# Patient Record
Sex: MALE | Race: WHITE | Employment: OTHER | URBAN - METROPOLITAN AREA
[De-identification: names, ages, dates, MRNs, and addresses within clinical notes are randomized per-mention and may not be internally consistent; named-entity substitution may affect disease eponyms.]

---

## 2020-03-02 RX ORDER — DIPHENHYDRAMINE HYDROCHLORIDE 50 MG/ML
50 INJECTION, SOLUTION INTRAMUSCULAR; INTRAVENOUS ONCE
Status: CANCELLED | OUTPATIENT
Start: 2020-03-02 | End: 2020-03-02

## 2020-03-02 RX ORDER — SODIUM CHLORIDE 0.9 % (FLUSH) 0.9 %
5-40 SYRINGE (ML) INJECTION AS NEEDED
Status: CANCELLED | OUTPATIENT
Start: 2020-03-02

## 2020-03-02 RX ORDER — ATROPINE SULFATE 0.1 MG/ML
0.5 INJECTION INTRAVENOUS
Status: CANCELLED | OUTPATIENT
Start: 2020-03-02 | End: 2020-03-03

## 2020-03-02 RX ORDER — EPINEPHRINE 0.1 MG/ML
1 INJECTION INTRACARDIAC; INTRAVENOUS
Status: CANCELLED | OUTPATIENT
Start: 2020-03-02 | End: 2020-03-03

## 2020-03-02 RX ORDER — DEXTROMETHORPHAN/PSEUDOEPHED 2.5-7.5/.8
1.2 DROPS ORAL
Status: CANCELLED | OUTPATIENT
Start: 2020-03-02

## 2020-03-02 RX ORDER — SODIUM CHLORIDE 0.9 % (FLUSH) 0.9 %
5-40 SYRINGE (ML) INJECTION EVERY 8 HOURS
Status: CANCELLED | OUTPATIENT
Start: 2020-03-02

## 2020-03-04 ENCOUNTER — HOSPITAL ENCOUNTER (OUTPATIENT)
Age: 75
Setting detail: OUTPATIENT SURGERY
Discharge: HOME | End: 2020-03-04
Attending: INTERNAL MEDICINE | Admitting: INTERNAL MEDICINE
Payer: MEDICARE

## 2020-03-04 VITALS
HEART RATE: 53 BPM | BODY MASS INDEX: 25.52 KG/M2 | RESPIRATION RATE: 16 BRPM | DIASTOLIC BLOOD PRESSURE: 77 MMHG | OXYGEN SATURATION: 93 % | SYSTOLIC BLOOD PRESSURE: 120 MMHG | WEIGHT: 172.3 LBS | HEIGHT: 69 IN | TEMPERATURE: 96.7 F

## 2020-03-04 PROCEDURE — 88342 IMHCHEM/IMCYTCHM 1ST ANTB: CPT

## 2020-03-04 PROCEDURE — 88305 TISSUE EXAM BY PATHOLOGIST: CPT

## 2020-03-04 PROCEDURE — G0500 MOD SEDAT ENDO SERVICE >5YRS: HCPCS | Performed by: INTERNAL MEDICINE

## 2020-03-04 PROCEDURE — 77030003657 HC NDL SCLER BSC -B: Performed by: INTERNAL MEDICINE

## 2020-03-04 PROCEDURE — 99153 MOD SED SAME PHYS/QHP EA: CPT | Performed by: INTERNAL MEDICINE

## 2020-03-04 PROCEDURE — 74011250636 HC RX REV CODE- 250/636: Performed by: INTERNAL MEDICINE

## 2020-03-04 PROCEDURE — 77030020018 HC MRKR ENDOSC SPOT 5ML SYR GISP -B: Performed by: INTERNAL MEDICINE

## 2020-03-04 PROCEDURE — 77030040361 HC SLV COMPR DVT MDII -B: Performed by: INTERNAL MEDICINE

## 2020-03-04 PROCEDURE — 76040000008: Performed by: INTERNAL MEDICINE

## 2020-03-04 PROCEDURE — 77030013991 HC SNR POLYP ENDOSC BSC -A: Performed by: INTERNAL MEDICINE

## 2020-03-04 PROCEDURE — 88341 IMHCHEM/IMCYTCHM EA ADD ANTB: CPT

## 2020-03-04 RX ORDER — TAMSULOSIN HYDROCHLORIDE 0.4 MG/1
0.4 CAPSULE ORAL DAILY
COMMUNITY

## 2020-03-04 RX ORDER — HYDROCHLOROTHIAZIDE 25 MG/1
25 TABLET ORAL DAILY
COMMUNITY

## 2020-03-04 RX ORDER — SODIUM CHLORIDE 9 MG/ML
1000 INJECTION, SOLUTION INTRAVENOUS CONTINUOUS
Status: DISCONTINUED | OUTPATIENT
Start: 2020-03-04 | End: 2020-03-04 | Stop reason: HOSPADM

## 2020-03-04 RX ORDER — FLUMAZENIL 0.1 MG/ML
0.2 INJECTION INTRAVENOUS
Status: DISCONTINUED | OUTPATIENT
Start: 2020-03-04 | End: 2020-03-04 | Stop reason: HOSPADM

## 2020-03-04 RX ORDER — ATORVASTATIN CALCIUM 40 MG/1
TABLET, FILM COATED ORAL DAILY
COMMUNITY

## 2020-03-04 RX ORDER — MIDAZOLAM HYDROCHLORIDE 1 MG/ML
.25-5 INJECTION, SOLUTION INTRAMUSCULAR; INTRAVENOUS
Status: DISCONTINUED | OUTPATIENT
Start: 2020-03-04 | End: 2020-03-04 | Stop reason: HOSPADM

## 2020-03-04 RX ORDER — FENTANYL CITRATE 50 UG/ML
100 INJECTION, SOLUTION INTRAMUSCULAR; INTRAVENOUS
Status: DISCONTINUED | OUTPATIENT
Start: 2020-03-04 | End: 2020-03-04 | Stop reason: HOSPADM

## 2020-03-04 RX ORDER — NALOXONE HYDROCHLORIDE 0.4 MG/ML
0.4 INJECTION, SOLUTION INTRAMUSCULAR; INTRAVENOUS; SUBCUTANEOUS
Status: DISCONTINUED | OUTPATIENT
Start: 2020-03-04 | End: 2020-03-04 | Stop reason: HOSPADM

## 2020-03-04 RX ADMIN — SODIUM CHLORIDE 1000 ML: 900 INJECTION, SOLUTION INTRAVENOUS at 08:32

## 2020-03-04 NOTE — H&P
Assessment/Plan  # Detail Type Description    1. Assessment Personal history of colonic polyps (Z86.010). Patient Plan 75 yr old male patient of Kenneth Medina seen for personal history of polyps. fx hx of colon cancer. he has one bm daily  Last colonoscopy was done 4/28/09 done by myself,  findings a single sessile polyp found in the splenic flexure, severe diverticulosis found in the descending colon, sigmoid colon and splenic flexure. Muscular hypertonia internal hemorrhoids and tortuous colon  Path revealed tubular adenoma     He is asymptomatic and has no family history of colon neoplasm. I explained to the patient the procedure of colonoscopy and the risks involved including but not limited to bleeding, perforation, infection or missing a lesion if the bowels are not well clean or are unusually tortuous and difficult. I gave him the  Suprep. He was agreeable to this and answered his questions. Plan Orders He will be scheduled for GASTROENTEROLOGY PROCEDURE, Next Lab Date is within 1 Month on 03/04/2020. Clinical information/comments: at location [facility]. The surgeon scheduled is Dieudonne Perez MD. An assistant has not been requested. This 75year old male presents for Hx polyp/colon cancer. History of Present Illness:  1. Hx polyp/colon cancer   Prior screening:  colonoscopy. Denies risk factors. Pertinent negatives include abdominal pain, change in bowel habits, change in stool caliber, constipation, decreased appetite, diarrhea, melena, nausea, rectal bleeding, vomiting, weight gain and weight loss. Additional information: No family history of colon cancer, No family history of Crohn's/colitis, NSAID/ASA use and Patient has occasional hemorrhoids. PROBLEM LIST:   Problem List reviewed.    Problem Description Onset Date Chronic Clinical Status Notes   COPD  Y  stage 1 per PFTs 4/2017   Benign essential hypertension 02/20/2015 Y     Solitary pulmonary nodule  Y  noted on LDCT, rpt CT in 6m - ,  scheduled as advised by radiologist.   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 The patient is right-handed. Preferred language is [facility] [place]. EDUCATION/EMPLOYMENT/OCCUPATION  Employment History Status Retired Restrictions    Retired        MARITAL STATUS/FAMILY/SOCIAL SUPPORT  Marital status:    Tobacco use status: Ex-cigarette smoker. Smoking status: Light tobacco smoker. TOBACCO SCREENING:  Patient has used tobacco.     SMOKING STATUS  Type Smoking Status Usage Per Day Years Used Pack Years Total Pack Years   Cigarette Light tobacco smoker  54.00     Cigar Light tobacco smoker 1 Cigars        TOBACCO/VAPING EXPOSURE  No passive smoke exposure. ALCOHOL  There is a history of alcohol use. Type: Beer and wine. consumed rarely. CAFFEINE  The patient uses caffeine: coffee - 4 cups a day. LIFESTYLE  Exercises occasionally. HOME ENVIRONMENT/SAFETY  The home has smoke detectors.   Carbon monoxide detector at home.    Uses seat belts. Medications (active prior to today)  Medication Name Sig Description Start Date Stop Date Refilled Rx Elsewhere   aspirin 81 mg Chewable Tab chew 1 tablet (81MG)  by oral route  every day 05/09/2012   N   clobetasol 0.05 % topical ointment apply by topical route 2 times every day a thin layer to the affected area(s) 03/14/2017 01/09/2020 03/14/2017 N   hydrochlorothiazide 25 mg tablet TAKE 1 TABLET  BY MOUTH DAILY 04/19/2019 04/19/2019 N   atorvastatin 40 mg tablet take 1 tablet by oral route  every day at bedtime. 04/19/2019 04/19/2019 N   Flomax 0.4 mg capsule TAKE 1 CAPSULE BY MOUTH DAILY 10/14/2019  10/14/2019 N   ProAir HFA 90 mcg/actuation aerosol inhaler INHALE TWO PUFFS BY MOUTH EVERY 4 TO 6 HOURS AS NEEDED. 10/14/2019  10/14/2019 N   Anoro Ellipta 62.5 mcg-25 mcg/actuation powder for inhalation INHALE 1 PUFF BY INHALATION ROUTE EVERY DAY AT SAME TIME EACH DAY. 10/14/2019  10/14/2019 N     Patient Status   Completed with information received for patient in a summary of care record. Medication Reconciliation  Medications reconciled today. Medication Reviewed  Adherence Medication Name Sig Desc Elsewhere Status   taking as directed aspirin 81 mg Chewable Tab chew 1 tablet (81MG)  by oral route  every day N Verified   taking as directed hydrochlorothiazide 25 mg tablet TAKE 1 TABLET  BY MOUTH DAILY N Verified   taking as directed atorvastatin 40 mg tablet take 1 tablet by oral route  every day at bedtime. N Verified   taking as directed Flomax 0.4 mg capsule TAKE 1 CAPSULE BY MOUTH DAILY N Verified   taking as directed ProAir HFA 90 mcg/actuation aerosol inhaler INHALE TWO PUFFS BY MOUTH EVERY 4 TO 6 HOURS AS NEEDED. N Verified   taking as directed Anoro Ellipta 62.5 mcg-25 mcg/actuation powder for inhalation INHALE 1 PUFF BY INHALATION ROUTE EVERY DAY AT SAME TIME EACH DAY.  N Verified     Medications (Added, Continued or Stopped today)  Start Date Medication Directions PRN Status PRN Reason Instruction Stop Date   10/14/2019 Anoro Ellipta 62.5 mcg-25 mcg/actuation powder for inhalation INHALE 1 PUFF BY INHALATION ROUTE EVERY DAY AT SAME TIME EACH DAY. N      05/09/2012 aspirin 81 mg Chewable Tab chew 1 tablet (81MG)  by oral route  every day N      04/19/2019 atorvastatin 40 mg tablet take 1 tablet by oral route  every day at bedtime. N      03/14/2017 clobetasol 0.05 % topical ointment apply by topical route 2 times every day a thin layer to the affected area(s) N   01/09/2020   10/14/2019 Flomax 0.4 mg capsule TAKE 1 CAPSULE BY MOUTH DAILY N      04/19/2019 hydrochlorothiazide 25 mg tablet TAKE 1 TABLET  BY MOUTH DAILY N      10/14/2019 ProAir HFA 90 mcg/actuation aerosol inhaler INHALE TWO PUFFS BY MOUTH EVERY 4 TO 6 HOURS AS NEEDED. N        Allergies:  Ingredient Reaction (Severity) Medication Name Comment   NO KNOWN ALLERGIES            Review of System  System Neg/Pos Details   Constitutional Negative Chills, Fever, Malaise, Weight gain and Weight loss. ENMT Negative Ear infections, Nasal congestion, Sinus Infection and Sore throat. Eyes Negative Double vision and Eye pain. Respiratory Positive Chronic cough. Respiratory Negative Asthma, Dyspnea, Pleuritic pain and Wheezing. Cardio Negative Chest pain, Edema and Irregular heartbeat/palpitations. GI Negative Abdominal pain, Change in bowel habits, Change in stool caliber, Constipation, Decreased appetite, Diarrhea, Dysphagia, Heartburn, Hematemesis, Hematochezia, Melena, Nausea, Rectal bleeding, Reflux and Vomiting.  Negative Dysuria, Hematuria, Urinary frequency, Urinary incontinence and Urinary retention. Endocrine Negative Cold intolerance, Gynecomastia, Heat intolerance and Increased thirst.   Neuro Negative Dizziness, Headache, Numbness, Tremors and Vertigo. Psych Negative Anxiety, Depression and Increased stress. Integumentary Negative Hives, Pruritus and Rash.    MS Negative Back pain, Joint pain and Myalgia. Hema/Lymph Negative Easy bleeding, Easy bruising and Lymphadenopathy. Allergic/Immuno Positive Seasonal allergies. Allergic/Immuno Negative Chemicals in work place, Contact allergy, Food allergies and Immunosuppression. Reproductive Negative Penile discharge and Sexual dysfunction. Vital Signs   Height  Time ft in cm Last Measured Height Position   12:17 PM 5.0 10.00 177.80 10/14/2019 0     MAP (Calculated) Arterial Line 1 BP (mmHg) BP Patient Position Resp SpO2 O2 Device O2 Flow Rate (L/min) Pre/Post Ductal Weight       03/04/20 0814 98.1 °F (36.7 °C) 60 138/85 103   17 96 % Room air   78.2 kg (172 lb 4.8 oz)       PHYSICAL EXAM:  Exam Findings Details   Constitutional Normal No acute distress. Well Nourished. Well developed. Eyes Normal General - Right: Normal, Left: Normal. Conjunctiva - Right: Normal, Left: Normal. Sclera - Right: Normal, Left: Normal. Cornea - Right: Normal, Left: Normal. Pupil - Right: Normal, Left: Normal.   Nose/Mouth/Throat Normal Lips/teeth/gums - Normal. Tongue - Normal. Buccal mucosa - Normal. Palate & uvula - Normal.   Neck Exam Normal Inspection - Normal. Palpation - Normal. Thyroid gland - Normal. Cervical lymph nodes - Normal.   Respiratory Normal Inspection - Normal. Auscultation - Normal. Percussion - Normal. Cough - Absent. Effort - Normal.   Cardiovascular Normal Heart rate - Regular rate. Heart sounds - Normal S1, Normal S2. Murmurs - None. Extremities - No edema. Abdomen Normal Inspection - Normal. Appliance(s) - None. Abdominal muscles - Normal. Auscultation - Normal. Percussion - Normal. Anterior palpation - Normal, No guarding, No rebound. CVA tenderness - None. Umbilicus - Normal. Abdominal reflexes - Normal. No abdominal tenderness. No hepatic enlargement. No splenic enlargement. No hernia. No Ascites. No palpable mass. Martinez's sign - Negative.    Skin Normal Inspection - Normal.   Musculoskeletal Normal Hands - Left: Normal, Right: Normal.   Extremity Normal No cyanosis. No edema. Clubbing - Absent. Neurological Normal Level of consciousness - Normal. Orientation - Normal. Memory - Normal. Motor - Normal. Balance & gait - Normal. Coordination - Normal. Fine motor skills - Normal. DTRs - Normal.   Psychiatric Normal Orientation - Oriented to time, place, person & situation. Not anxious. Appropriate mood and affect. Behavior appropriate for age. Sufficient language. No memory loss.        No change in H&P

## 2020-03-04 NOTE — PROCEDURES
[facility]  Colonoscopy Procedure Report  _______________________________________________________  Patient: Segundo Warren                                         Attending Physician: Saida Jacques MD    Patient ID: [id number]                                      Referring Physician: Faye Posadas MD    Exam Date: March 4, 2020 _______________________________________________________      Introduction: A  75 y.o. male patient, presents for outpatient Colonoscopy    Indications: patient of Betito Tidwell seen for personal history of polyps. He has one bm daily. Last colonoscopy was done 4/28/09 done by myself,  findings a sessile polyp in the splenic flexure, severe diverticulosis found in the descending colon, sigmoid colon and splenic flexure. Muscular hypertonia internal hemorrhoids and tortuous colon  Path revealed tubular adenoma     He is asymptomatic and has no family history of colon neoplasm. Consent: The benefits, risks, and alternatives to the procedure were discussed and informed consent was obtained from the patient. Preparation: EKG, pulse, pulse oximetry and blood pressure were monitored throughout the procedure. ASA Classification: Class 1 - . The heart is an S1-S2 and regular heart rate and rhythm. Lungs are clear to auscultation and percussion. Abdomen is soft, nondistended, and nontender. Mental Status: awake, alert, and oriented to person, place, and time    Medications:  · Fentanyl 100 mcg IV before procedure. · Versed 5 mg IV throughout the procedure. Rectal Exam: Normal Rectal Exam. No Blood. Prostate not enlarged. Pathology Specimens: Three specimens removed. Procedure: The colonoscope was passed with difficulty through the anus under direct visualization and advanced to the cecum and 10 cm inside the terminal ileum. The patient required positioning on the back to aid in the passage of the scope. The scope was withdrawn and the mucosa was carefully examined. The quality of the preparation was excellent. The views were excellent. The patient's toleration of the procedure was excellent. Retroflexion was preformed in the ascending colon and hepatic flexure. The exam was done twice to the cecum. Total time is 45 minutes and withdrawal time is 30 minutes. Findings:    Rectum:   Small internal hemorrhoids  Sigmoid:   Tortuous sigmoid colon with severe diverticulosis and muscular hypertonia. 2 sessile polyps in the sigmoid one adenoma 6 mm cold snared and second 6.5 mm sessile hyperplastic? Hot snared. Descending Colon:   Normal  Transverse Colon:   7 sessile adenoma polyp in the proximal transverse colon, hot snared and a 6 mm submucosal hard GIST type nodule in the mid transverse colon hot snared with residual tissue embedded in the muscularis propria suggestive of leiomyoma submitted in different bottle. [place] ink spot injected submucosally  Just distal to the resection site in two adjacent locations to ming it for future reference. Ascending Colon: Moderate diverticulosis in the ascending colon. Cecum:   Normal  Terminal Ileum:   Normal      Unplanned Events: There were no unplanned events. Estimated Blood Loss: None  Impressions:    Small internal hemorrhoids. Small internal hemorrhoids. Tortuous sigmoid colon with severe diverticulosis and muscular hypertonia. 2 sessile polyps in the sigmoid one adenoma 6 mm cold snared and second 6.5 mm sessile hyperplastic? Hot snared. 7 sessile adenoma polyp in the proximal transverse colon, hot snared and a 6 mm submucosal hard GIST type nodule in the mid transverse colon hot snared with residual tissue embedded in the muscularis propria suggestive of leiomyoma submitted in different bottle. [place] ink spot injected submucosally  Just distal to the resection site in two adjacent locations to ming it for future reference. Moderate diverticulosis in the ascending colon. Normal Mucosa.    Complications: None; patient tolerated the procedure well. Recommendations:  · Discharge home when standard parameters are met. · Resume a high fiber diet. · Colonoscopy recommendation in 5 years pending the result of the histology.   · Avoid being constipated    Procedure Codes:    · Jonah Zafar [[id number]]  · COLONOSCPY,FLEX,W/ [facility] [[id number]]    Endoscope Information:  Model Number(s)    [id number]   Assistant: None  Signed By: Joon Jones MD Date: March 4, 2020

## 2020-03-04 NOTE — DISCHARGE INSTRUCTIONS
Amelia Donato  [id number]  1945    COLON DISCHARGE INSTRUCTIONS    Discomfort:  Redness at IV site- apply warm compress to area; if redness or soreness persist- contact your physician  There may be a slight amount of blood passed from the rectum  Gaseous discomfort- walking, belching will help relieve any discomfort  You may not operate a vehicle til the next day. You may not engage in an occupation involving machinery or appliances til the next day. You may not drink alcoholic beverages til the next day. DIET:   High fiber diet. ACTIVITY:  You may not  resume your normal daily activities til the next day. it is recommended that you spend the remainder of the day resting -  avoid any strenuous activity. CALL M.D.  IF ANY SIGN OF:   Increasing pain, nausea, vomiting  Abdominal distension (swelling)  New increased bleeding (oral or rectal)  Fever (chills)  Pain in chest area  Bloody discharge from nose or mouth  Shortness of breath    You may not  take any Advil, Aspirin, Ibuprofen, Motrin, Aleve, or Goodys for 7 days, ONLY  Tylenol as needed for pain. Post procedure diagnosis:  HEMORRHOIDS; SIGMOID DIVERTICULOSIS; TORTUOUS COLON; COLON POLYPS    Follow-up Instructions: Your follow up colonoscopy will be in 5 years. We will notify you the results of your biopsy by letter within 2 weeks.     Robby Dixon MD  March 4, 2020       DISCHARGE SUMMARY from Nurse    The following personal items collected during your admission are returned to you:   Dental Appliance: Dental Appliances: None  Vision: Visual Aid: None  Hearing Aid:    Jewelry:    Clothing:    Other Valuables:    Valuables sent to safe:              PATIENT INSTRUCTIONS:    After general anesthesia or intravenous sedation, for 24 hours or while taking prescription Narcotics:  · Limit your activities  · Do not drive and operate hazardous machinery  · Do not make important personal or business decisions  · Do  not drink alcoholic beverages  · If you have not urinated within 8 hours after discharge, please contact your surgeon on call. Report the following to your surgeon:  · Excessive pain, swelling, redness or odor of or around the surgical area  · Temperature over 100.5  · Nausea and vomiting lasting longer than 4 hours or if unable to take medications  · Any signs of decreased circulation or nerve impairment to extremity: change in color, persistent  numbness, tingling, coldness or increase pain  · Any questions      No orders of the defined types were placed in this encounter. What to do at Home:  Recommended activity: as above    If you experience any of the following symptoms as above, please follow up with Dr. Jayda Tobias. *  Please give a list of your current medications to your Primary Care Provider. *  Please update this list whenever your medications are discontinued, doses are      changed, or new medications (including over-the-counter products) are added. *  Please carry medication information at all times in case of emergency situations. These are general instructions for a healthy lifestyle:    No smoking/ No tobacco products/ Avoid exposure to second hand smoke    Surgeon General's Warning:  Quitting smoking now greatly reduces serious risk to your health. Obesity, smoking, and sedentary lifestyle greatly increases your risk for illness    A healthy diet, regular physical exercise & weight monitoring are important for maintaining a healthy lifestyle    You may be retaining fluid if you have a history of heart failure or if you experience any of the following symptoms:  Weight gain of 3 pounds or more overnight or 5 pounds in a week, increased swelling in our hands or feet or shortness of breath while lying flat in bed. Please call your doctor as soon as you notice any of these symptoms; do not wait until your next office visit.     Recognize signs and symptoms of STROKE:    F-face looks uneven    A-arms unable to move or move unevenly    S-speech slurred or non-existent    T-time-call 911 as soon as signs and symptoms begin-DO NOT go       Back to bed or wait to see if you get better-TIME IS BRAIN. The discharge information has been reviewed with the patient and spouse. The patient and spouse verbalized understanding. Warning Signs of HEART ATTACK     Call 911 if you have these symptoms:   Chest discomfort. Most heart attacks involve discomfort in the center of the chest that lasts more than a few minutes, or that goes away and comes back. It can feel like uncomfortable pressure, squeezing, fullness, or pain.  Discomfort in other areas of the upper body. Symptoms can include pain or discomfort in one or both arms, the back, neck, jaw, or stomach.  Shortness of breath with or without chest discomfort.  Other signs may include breaking out in a cold sweat, nausea, or lightheadedness. Don't wait more than five minutes to call 911 - MINUTES MATTER! Fast action can save your life. Calling 911 is almost always the fastest way to get lifesaving treatment. Emergency Medical Services staff can begin treatment when they arrive -- up to an hour sooner than if someone gets to the hospital by car. The discharge information has been reviewed with the patient and caregiver. The patient and caregiver verbalized understanding. Discharge medications reviewed with the patient and guardian and appropriate educational materials and side effects teaching were provided.     Patient armband removed and shredded

## 2022-05-16 RX ORDER — DIPHENHYDRAMINE HYDROCHLORIDE 50 MG/ML
50 INJECTION, SOLUTION INTRAMUSCULAR; INTRAVENOUS ONCE
Status: CANCELLED | OUTPATIENT
Start: 2022-05-16 | End: 2022-05-16

## 2022-05-16 RX ORDER — SODIUM CHLORIDE 0.9 % (FLUSH) 0.9 %
5-40 SYRINGE (ML) INJECTION EVERY 8 HOURS
Status: CANCELLED | OUTPATIENT
Start: 2022-05-16

## 2022-05-16 RX ORDER — DEXTROMETHORPHAN/PSEUDOEPHED 2.5-7.5/.8
1.2 DROPS ORAL
Status: CANCELLED | OUTPATIENT
Start: 2022-05-16

## 2022-05-16 RX ORDER — EPINEPHRINE 0.1 MG/ML
1 INJECTION INTRACARDIAC; INTRAVENOUS
Status: CANCELLED | OUTPATIENT
Start: 2022-05-16 | End: 2022-05-17

## 2022-05-16 RX ORDER — ATROPINE SULFATE 0.1 MG/ML
0.5 INJECTION INTRAVENOUS
Status: CANCELLED | OUTPATIENT
Start: 2022-05-16 | End: 2022-05-17

## 2022-05-16 RX ORDER — SODIUM CHLORIDE 0.9 % (FLUSH) 0.9 %
5-40 SYRINGE (ML) INJECTION AS NEEDED
Status: CANCELLED | OUTPATIENT
Start: 2022-05-16

## 2022-05-18 ENCOUNTER — HOSPITAL ENCOUNTER (OUTPATIENT)
Age: 77
Setting detail: OUTPATIENT SURGERY
Discharge: HOME | End: 2022-05-18
Attending: INTERNAL MEDICINE | Admitting: INTERNAL MEDICINE
Payer: MEDICARE

## 2022-05-18 VITALS
TEMPERATURE: 97.8 F | BODY MASS INDEX: 23.96 KG/M2 | SYSTOLIC BLOOD PRESSURE: 129 MMHG | OXYGEN SATURATION: 94 % | RESPIRATION RATE: 16 BRPM | DIASTOLIC BLOOD PRESSURE: 60 MMHG | HEART RATE: 45 BPM | HEIGHT: 69 IN | WEIGHT: 161.8 LBS

## 2022-05-18 PROCEDURE — 74011250636 HC RX REV CODE- 250/636: Performed by: INTERNAL MEDICINE

## 2022-05-18 PROCEDURE — 76040000007: Performed by: INTERNAL MEDICINE

## 2022-05-18 PROCEDURE — 77030020268 HC MISC GENERAL SUPPLY: Performed by: INTERNAL MEDICINE

## 2022-05-18 PROCEDURE — 77030004927 HC CATH ELECHEMSTAS BSC -C: Performed by: INTERNAL MEDICINE

## 2022-05-18 PROCEDURE — 99153 MOD SED SAME PHYS/QHP EA: CPT | Performed by: INTERNAL MEDICINE

## 2022-05-18 PROCEDURE — G0500 MOD SEDAT ENDO SERVICE >5YRS: HCPCS | Performed by: INTERNAL MEDICINE

## 2022-05-18 PROCEDURE — 2709999900 HC NON-CHARGEABLE SUPPLY: Performed by: INTERNAL MEDICINE

## 2022-05-18 PROCEDURE — 77030040361 HC SLV COMPR DVT MDII -B: Performed by: INTERNAL MEDICINE

## 2022-05-18 PROCEDURE — 77030039961 HC KT CUST COLON BSC -D: Performed by: INTERNAL MEDICINE

## 2022-05-18 RX ORDER — ASPIRIN 81 MG/1
TABLET ORAL DAILY
COMMUNITY

## 2022-05-18 RX ORDER — FLUMAZENIL 0.1 MG/ML
0.2 INJECTION INTRAVENOUS
Status: ACTIVE | OUTPATIENT
Start: 2022-05-18 | End: 2022-05-18

## 2022-05-18 RX ORDER — MIDAZOLAM HYDROCHLORIDE 1 MG/ML
.25-5 INJECTION, SOLUTION INTRAMUSCULAR; INTRAVENOUS
Status: ACTIVE | OUTPATIENT
Start: 2022-05-18 | End: 2022-05-18

## 2022-05-18 RX ORDER — SODIUM CHLORIDE 9 MG/ML
1000 INJECTION, SOLUTION INTRAVENOUS CONTINUOUS
Status: DISPENSED | OUTPATIENT
Start: 2022-05-18 | End: 2022-05-18

## 2022-05-18 RX ORDER — NALOXONE HYDROCHLORIDE 0.4 MG/ML
0.4 INJECTION, SOLUTION INTRAMUSCULAR; INTRAVENOUS; SUBCUTANEOUS
Status: ACTIVE | OUTPATIENT
Start: 2022-05-18 | End: 2022-05-18

## 2022-05-18 RX ORDER — FENTANYL CITRATE 50 UG/ML
100 INJECTION, SOLUTION INTRAMUSCULAR; INTRAVENOUS
Status: ACTIVE | OUTPATIENT
Start: 2022-05-18 | End: 2022-05-18

## 2022-05-18 RX ADMIN — SODIUM CHLORIDE 1000 ML: 9 INJECTION, SOLUTION INTRAVENOUS at 10:18

## 2022-05-18 NOTE — PROCEDURES
(EGD) Esophagogastroduodenoscopy (UPPER ENDOSCOPY) Procedure Note  [facility] [place]  __________________________________________________________________________________________________________________________      5/18/2022     Patient: Velma Councilman YOB: 1945 Gender: male Age: 67 y.o. INDICATION:  Pt of Dr. Graham Johnson, referred to GI for evaluation and treatment of occult blood in stool. There is noted unchanged circumferential thickening of the wall of the mid and distal esophagus. This has been noted on both scans of CT Chest & CT Lung dating back since 2019. He has been on Omeprazole 20 mg daily for ~8 months but he has not seen any difference as he used to have occasional heartburns are only every 2 months. He does have moderate belching but no N/Vx or dysphagia. He has never had an EGD before. : Judith Morris MD    Referring Provider:  Rosangela Cerna MD    Sedation:  Versed 6 mg IV, Fentanyl 100 mcg IV    Procedure Details:  After infomed consent was obtained for the procedure, with all risks and benefits of procedure explained to the patient, he was taken to the endoscopy suite and placed in the left lateral decubitus position. Following sequential administration of sedation as per above, the endoscope was inserted into the mouth and advanced under direct vision to third portion of the duodenum. A careful inspection was made as the gastroscope was withdrawn, including a retroflexed view of the proximal stomach; findings and interventions are described below. OROPHARYNX: The vocal Cords and the larynx are normal.   ESOPHAGUS: The proximal, mid, and distal oesophagus are normal. The Z-Line is intact located at:40cm. Large 3 to 5 cm Hiatal hernia . Diaphragmatic opening or notch is located at 45 cm. STOMACH: The stomach is large. No evidence of blood, fluid or solid food retention.  The fundus on antegrade and retroflex views reveals one large AVM lesion in the fundus hard to reach initially cauterized with bipolar probe but continued to bleed so metallic clips applied  . The cardia, body, lesser curvature, greater curvature, the antrum, and pylorus are normal. The gastric mucosa is normal.  DUODENUM:  3 tiny to small AVM lesions in distal D2 but the larges in D3 all cauterized otherwise, the bulb, second, third, portions and major papilla are unremarkable. PROXIMAL JEJUNUM:  Not examined. .  Therapies:  Nil    Specimen: none           Complications:   None    EBL:  Negligible. IMPLANTS: * No implants in log *  IMPRESSION: Large 3 to 5 cm Hiatal hernia . Diaphragmatic opening or notch is located at 45 cm. The stomach is large. The fundus on antegrade and retroflex views reveals one large AVM lesion in the fundus hard to reach initially cauterized with bipolar probe but continued to bleed so metallic clips applied. 3 tiny to small AVM lesions in distal D2 but the larges in D3 all cauterized           RECOMMENDATION:  May resume antireflux diet. Avoid NSAID's. Make a FU appointment at the office. continue taking the Omeprazole 20 mg daily x weeks and then as needed. Recommend to repeat EGD in 3 to 6 months.  Continue to monitor cbc, iron profile and occult blood in the stools every 3 months x 3    Assistant: None    --Adelaida Modi MD on 5/18/2022 at 11:02 AM

## 2022-05-18 NOTE — H&P
Assessment/Plan  # Detail Type Description    1. Assessment Abnormal findings on dx imaging of prt digestive tract (R93.3). Impression 1/18/2022:  Pt of Dr. Brain Cadena, referred to GI for evaluation and treatment of occult blood in stool.  ______________________________________  There is noted unchanged circumferential thickening of the wall of the mid and distal esophagus. This has been noted on both scans of CT Chest & CT Lung dating back since 2019. He has never had an EGD before. Patient Plan 1/18/2022:  GI Scope Procedures Scheduled:   *EGD: 5/18/2022 w/Dr. Shauna Waldron @[facility]  *C-scope:  3/7/2023 w/Dr. Shauna Waldron @[facility]    *First EGD Plan: Schedule 1st  Will proceed with EGD with Dr. Shauna Waldron, to evaluate upper GI tract for abnormalities, evidence of bleeding, and Castaneda's epithelium with biopsies, polypectomy, or dilation as indicated. -Patient is advised that they should take their aspirin (if prescribed) up until the day of procedure.  -Patient is advised to take Thyroid meds, BP meds, beta blocker and any cardiac meds the AM of procedure with sip clear liquid, 4 hours prior to procedure start time. -Bring inhalers to procedure. *EGD Risks:  Explained risks of procedure to include bleeding, infection, reaction to sedation, and perforation with possible need for admission to the hospital, and in the most extensive of  circumstances, the patient may require surgery. Pt verbalized understanding of these risks and is agreeable with this procedure. Plan Orders Further diagnostic evaluations ordered today include(s) UPPER GI ENDOSCOPY, DIAGNOSIS to be performed. 2. Assessment GERD w/o esophagitis (K21.9). Impression 1/18/2022:  Pt denies ever having heartburn, but has been taking Omeprazole 20mg QAM.         3. Assessment Occult blood in stool (R19.5).     Impression 1/18/2022:  Positive iFOBT dated 11/8/2021 [facility])  Test done as a part of routine annual physical exam, and is incidental finding. Pt is asymptomatic, keep c-scope schedule as previously recommended. Due for 3yr repeat colonoscopy after 3/4/2023  See below for details. Patient Plan 1/18/2022: See below for details         4. Assessment Personal history of colonic polyps (Z86.010). Impression 1/18/2022:  Pt will be due on 3/4/2023 for 3yr Recall, for surveillance of colonic adenomas and GIST.   _________________________________  *Last colonoscopy (2nd exam @73yo) was done on 3/4/2020 by Dr. Severo Goodie @[facility]:   *2020 C-scope Findings: Small internal hemorrhoids. Tortuous sigmoid colon with severe diverticulosis and muscular hypertonia. 2x sessile polyps in the sigmoid: one adenoma, 6 mm, cold snared (Tubular Adenoma); and second, 6.5 mm, sessile hyperplastic?, Hot snared (Tubular Adenoma). 7 mm sessile adenoma polyp in the proximal transverse colon, hot snared (Tubular Adenoma) and a 6 mm submucosal hard GIST type nodule in the mid transverse colon, hot snared (GIST Patho: Type- Spindle Cell; Low Grade- Grade 1) with residual tissue embedded in the muscularis propria suggestive of leiomyoma submitted in different bottle. [place] ink spot injected submucosally  Just distal to the resection site in two adjacent locations to ming it for future reference. Moderate diverticulosis in the ascending colon. Normal Mucosa. 3yr Recall recommended: Due Next: 3/4/2023    *Prior c-scope (1st exam- @64yo) was done on 4/28/2009 by Dr. Severo Goodie @[facility]:   *2009 C-scope Findings: Muscular hypertonia, internal hemorrhoids, and tortuous colon. Severe diverticulosis found in the descending colon, sigmoid colon and splenic flexure. 1x sessile polyp in the splenic flexure (Tubular adenoma). 5yr Recall (Was not kept). _________________________________  Average risk, no FHx of colon cancer. Asymptomatic of current GI complaints. BMI:  , BM: 1/day.   *PM/SH: BPH, HTN (2015), HLD (2011), COPD (2011), Lung Nodules, DM2 (2020), s/p Tonsillectomy & Adenoidectomy (1956), Right Retinal Detachment, s/p Retinal Repair (2016), s/p Bilateral Cataract Extraction (2018), s/p Right Inguinal Hernia Repair w/Mesh (1985), s/p Hernia Repair (1996). No reported hx of additional abdominal surgeries, strokes, or CAD. Patient Plan 1/18/2022:  *C-scope Plan: (Schedule after 3yr Anniversary: 3/4/2023)  Third Colonoscopy ordered with Dr. Angelia Amos @[facility] with Miralax bowel prep, and Mag Citrate 2 days before prep, and Miralax and stool softeners starting 3 days before prep.  -Patient is advised that they should take their aspirin (if prescribed) up until the day of procedure.  -Patient is advised to take Thyroid meds, BP meds, beta blockers, and any cardiac meds the AM of procedure with sip clear liquid after prep. -Bring inhalers to procedure. *C-scope Risks:  Stressed importance of following all bowel preparation instructions. Explained the procedure to the patient including all risks and benefits. These risks consist of missed lesions on exam, bleeding, and bowel perforation with possible need for admission to the hospital, and in the most extensive of  circumstances, the patient may require surgery. Pt verbalized understanding of these risks and is agreeable with this procedure. Plan Orders Further diagnostic evaluations ordered today include(s) DIAGNOSTIC COLONOSCOPY to be performed. 5. 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 Impression 1/18/2022: See Above. 6. Assessment Internal hemorrhoid w/o mention of degree (K64.8). Impression 1/18/2022: See Above. This 67year old  patient was referred by Racquel Doyle. This 67year old male presents for Blood in stool and Abnormal GI Study. History of Present Illness  1. Blood in stool   Onset: 2 months ago. Severity level is mild. Duration varies. Quality:  FOBT positive. The problem is unchanged.   Pertinent negatives include abdominal distention, abdominal pain, bloating, change in bowel habits, constipation, decreased appetite, diarrhea, dysphagia, heartburn, nausea, perirectal itching, rectal pain, rectal pain associated with bleeding, vomiting and weight loss. Comments: Positive iFOBT: 11/18/2021, Pt reports hx of internal hemorrhoids, but no prior hx of rectal bleeding. No rectal bleeding seen, only tested. BM: 1x daily  _______________________________________________  *Last colonoscopy (2nd exam @75yo) was done on 3/4/2020 by Dr. Lili Gibbons @[facility]:   *2020 C-scope Findings: Small internal hemorrhoids. Tortuous sigmoid colon with severe diverticulosis and muscular hypertonia. 2x sessile polyps in the sigmoid: one adenoma, 6 mm, cold snared (Tubular Adenoma); and second, 6.5 mm, sessile hyperplastic?, Hot snared (Tubular Adenoma). 7 mm sessile adenoma polyp in the proximal transverse colon, hot snared (Tubular Adenoma) and a 6 mm submucosal hard GIST type nodule in the mid transverse colon, hot snared (GIST Patho: Type- Spindle Cell; Low Grade- Grade 1) with residual tissue embedded in the muscularis propria suggestive of leiomyoma submitted in different bottle. [place] ink spot injected submucosally  Just distal to the resection site in two adjacent locations to ming it for future reference. Moderate diverticulosis in the ascending colon. Normal Mucosa. 3yr Recall recommended: Due Next: 3/4/2023  *Prior c-scope (1st exam- @62yo) was done on 4/28/2009 by Dr. Lili Gibbons @[facility]:   *2009 C-scope Findings: Muscular hypertonia, internal hemorrhoids, and tortuous colon. Severe diverticulosis found in the descending colon, sigmoid colon and splenic flexure. 1x sessile polyp in the splenic flexure (Tubular adenoma). 5yr Recall (Was not kept). 2.  Abnormal GI Study   Onset: 3 years ago. Severity level is mild-moderate. Type of study:  CT Chest & CT Lung. Pertinent negatives include abdominal distention, abdominal pain, bloating, constipation, diarrhea, dysphagia, nausea, vomiting and Heartburn. Additional information:  There is unchanged circumferential thickening of the wall of the mid and distal esophagus. This has been noted on both scans of CT Chest & CT Lung dating back since 2019. He has never had an EGD. Problem List  Problem Description Onset Date Chronic Clinical Status Notes   COPD  Y  stage 1 per PFTs 2017   Pain in esophagus 2020 Y  Esophogeal thickening noted on LDCT, stable since last check, he refused GI eval, was started on PPI and advised to call if he changes his mind - 6/3/20   Diabetes mellitus without complication  Y     Type 2 diabetes  Y     Solitary pulmonary nodule  Y  noted on LDCT, rpt CT in  - ,  scheduled as advised by radiologist.   Benign essential hypertension 2015 Y     Esophagitis  Y  noted on LDCT every year, recent in , he refused further eval w GI, advised to continue Prilosec 40mg   Slowing of urinary stream 2012 Y     Raised prostate specific antigen 2012 Y     Benign prostatic hyperplasia 2012 Y     Hyperlipidemia 2011 Y     Impaired fasting glycaemia 2011 Y     Tobacco dependence syndrome 2011 Y     Diverticulitis of small intestine 2011 Y     Pure hyperglyceridemia 2011 Y     H/O lower GIT neoplasm 01/10/2020 N       Past Medical/Surgical History   (Detailed)  Disease/disorder Onset Date Management Date Comments   retinal detachment 2016 repair 2016      Cataract extraction 2018 AP 2017 - right, after retinal detachment     Hernia repair     Sx  Inguinal Hernia Repair w/Mesh     Sx  s/p Tonsillectomy & Adenoidectomy     Prostatitis             Family History   (Detailed)    Relationship Family Member Name  Age at Death Condition Onset Age Cause of Death       Family history of Cancer  N   Brother  N  Cancer, bladder  N   Sister  N  Cancer, breast  N     Social History  (Detailed)  Tobacco use reviewed. The patient is right-handed. Preferred language is [place]. Education/Employment/Occupation  Employment History Status Retired Restrictions    Retired        Marital Status/Family/Social Support  Marital status:      Tobacco use status: Ex-cigarette smoker. Smoking status: Light tobacco smoker. Tobacco Screening  Patient has used tobacco.     Smoking Status  Type Smoking Status Usage Per Day Years Used Pack Years Total Pack Years   Cigarette Light tobacco smoker  54.00     Cigar Light tobacco smoker 1 Cigars        Tobacco/Vaping Exposure  No passive smoke exposure. Alcohol  There is a history of alcohol use. Type: Beer and wine. consumed yearly. Caffeine  The patient uses caffeine: coffee - 4 cups a day. Lifestyle  Moderate activity level. Exercises occasionally. Diet  diabetic. Home Environment/Safety  The home has smoke detectors. Carbon monoxide detector at home. Uses seat belts. Medications (active prior to today)  Medication Instructions Start Date Stop Date Refilled Elsewhere   aspirin 81 mg Chewable Tab chew 1 tablet (81MG)  by oral route  every day 05/09/2012   N   PROAIR HFA 90 MCG INHALER INHALE TWO PUFFS BY MOUTH EVERY 4 TO 6 HOURS AS NEEDED. 12/23/2020 12/23/2020 N   ANORO ELLIPTA 62.5-25 MCG INH INHALE ONE DOSE BY MOUTH DAILY AT SAME TIME EACH DAY 06/27/2021 06/27/2021 N   OMEPRAZOLE DR 20 MG CAPSULE TAKE ONE CAPSULE BY MOUTH DAILY BEFORE A MEAL 08/19/2021 08/19/2021 N   ATORVASTATIN 40 MG TABLET TAKE ONE TABLET BY MOUTH AT BEDTIME 12/07/2021 12/07/2021 N   BENAZEPRIL-HCTZ 5-6.25 MG TAB TAKE ONE TABLET BY MOUTH DAILY 01/06/2022 01/06/2022 N   Flomax 0.4 mg capsule TAKE ONE CAPSULE BY MOUTH TWICE A DAY 01/06/2022 01/06/2022 N     Patient Status   Completed with information received for patient in a summary of care record. Medication Reconciliation  Medications reconciled today.     Medication Reviewed  Adherence Medication Name Sig Desc Elsewhere Status   taking as directed aspirin 81 mg Chewable Tab chew 1 tablet (81MG)  by oral route  every day N Verified   taking as directed PROAIR HFA 90 MCG INHALER INHALE TWO PUFFS BY MOUTH EVERY 4 TO 6 HOURS AS NEEDED. N Verified   taking as directed ANORO ELLIPTA 62.5-25 MCG INH INHALE ONE DOSE BY MOUTH DAILY AT SAME TIME EACH DAY N Verified   taking as directed BENAZEPRIL-HCTZ 5-6.25 MG TAB TAKE ONE TABLET BY MOUTH DAILY N Verified   taking as directed ATORVASTATIN 40 MG TABLET TAKE ONE TABLET BY MOUTH AT BEDTIME N Verified   taking as directed OMEPRAZOLE DR 20 MG CAPSULE TAKE ONE CAPSULE BY MOUTH DAILY BEFORE A MEAL N Verified   taking as directed Flomax 0.4 mg capsule TAKE ONE CAPSULE BY MOUTH TWICE A DAY N Verified     Medications (Added, Continued or Stopped today)  Start Date Medication Directions PRN Status PRN Reason Instruction Stop Date   06/27/2021 ANORO ELLIPTA 62.5-25 MCG INH INHALE ONE DOSE BY MOUTH DAILY AT SAME TIME EACH DAY N      05/09/2012 aspirin 81 mg Chewable Tab chew 1 tablet (81MG)  by oral route  every day N      12/07/2021 ATORVASTATIN 40 MG TABLET TAKE ONE TABLET BY MOUTH AT BEDTIME N      01/06/2022 BENAZEPRIL-HCTZ 5-6.25 MG TAB TAKE ONE TABLET BY MOUTH DAILY N      01/06/2022 Flomax 0.4 mg capsule TAKE ONE CAPSULE BY MOUTH TWICE A DAY N      08/19/2021 OMEPRAZOLE DR 20 MG CAPSULE TAKE ONE CAPSULE BY MOUTH DAILY BEFORE A MEAL N      12/23/2020 PROAIR HFA 90 MCG INHALER INHALE TWO PUFFS BY MOUTH EVERY 4 TO 6 HOURS AS NEEDED. N        Allergies  Ingredient Reaction (Severity) Medication Name Comment   NO KNOWN ALLERGIES        Reviewed, no changes.       Orders  Status Lab Order Time Frame Comments   ordered PSA     specimen obtained CMP     specimen obtained Lipid Measured LDL     specimen obtained Urinalysis     specimen obtained CBC with Diff     specimen obtained CMP     specimen obtained TSH     specimen obtained Lipid Measured LDL     ordered UA DIPSTICK     result received PSA     ordered PSA     obtained CBC with Diff     obtained CMP     obtained TSH     obtained Lipid Measured LDL     scheduled Referrals: Gastroenterology.  Vanita Hylton MD. Location: . Evaluate and treat     result received PSA     ordered Follow-up:     ordered PSA     result received Comp Metabolic Panel (14) AU     result received Hemoglobin A1c     result received Lipid with LDL/HDL Ratio AU     result received CHEST X-RAY PA & LAT     ordered Prostate-Specific, Ag Serum     result received CBC With Differential/Platelet     result received Comp Metabolic Panel (14) AU     result received Lipid with LDL/HDL Ratio AU     result received TSH     result received Hemoglobin A1c     result received Prostate-Specific, Ag Serum     ordered US AAA screen Medicare     ordered Prostate-Specific, Ag Serum     result received US Retroperitoneum Renal/Aorta     result received TSH     result received Lipid with LDL/HDL Ratio AU     ordered Spirometry Pre/Post Bronchodilator PC     ordered Diffusing Capacity     ordered Complete PFT     ordered Airway Inhalation Treatment     result received CBC With Differential/Platelet     result received Comp Metabolic Panel (14) AU     result received Lipid with LDL/HDL Ratio AU     result received TSH     ordered Prostate-Specific, Ag Serum     result received Prostate-Specific, Ag Serum  Fasting: No   ordered Physical Activity Plan  better diet and exercise advised   result received Lipid with LDL/HDL Ratio AU     result received TSH     result received CBC With Differential/Platelet     result received Comp Metabolic Panel (14) AU     result received Prostate-Specific, Ag Serum     result received Comp Metabolic Panel (14) AU     result received Lipid Panel calc LDL     result received Chest PA & Lateral     ordered Comp Metabolic Panel (14) AU     ordered Lipid Panel calc LDL     ordered CBC With Differential/Platelet     ordered Comp Metabolic Panel (14) AU     ordered Hemoglobin A1c     ordered Lipid Panel calc LDL ordered TSH     ordered Prostate-Specific, Ag Serum     result received CT Lung Screening     scheduled Referrals: Gastroenterology. Corey Holt MD. Evaluate and treat     ordered CT CHEST W/O DYE     ordered GASTROENTEROLOGY PROCEDURE within 1 Month at location [address] surgeon scheduled is Corey Holt MD. An assistant has not been requested. result received Comp. Metabolic Panel (14)     result received Lipid Panel With LDL/HDL Ratio     result received CT CHEST W/O DYE     result received Hemoglobin A1c     scheduled Referrals: Dietician. Tiago Modi MS RD. Evaluate and treat     result received CT CHEST W/O DYE  Low dose lung screening 1 yr fu   ordered CBC With Differential/Platelet     ordered Comp Metabolic Panel (14) AU     ordered Lipid Panel calc LDL     ordered TSH     ordered Prostate-Specific, Ag Serum     ordered Urine Microalb/Creat ratio     ordered UA In Office No Micro     ordered Comp Metabolic Panel (14) AU     ordered Hemoglobin A1c     ordered Lipid Panel calc LDL     ordered UA In Office No Micro     result received CT Lung Screening     ordered Basic Metabolic Panel (8) AU     ordered CBC With Differential/Platelet     ordered Comp Metabolic Panel (14) AU     ordered Lipid Panel calc LDL     ordered TSH     ordered Hemoglobin A1c     ordered Urine Microalb/Creat ratio     ordered FOBT Immunoassay In Office     ordered Referrals: Gastroenterology. Corey Holt MD. Evaluate and treat     ordered UPPER GI ENDOSCOPY, DIAGNOSIS     ordered DIAGNOSTIC COLONOSCOPY         Review of Systems  System Neg/Pos Details   Constitutional Negative Fever and Weight loss. ENMT Negative Dysphagia and Sinus Infection. Eyes Negative Double vision. Respiratory Negative Asthma, Chronic cough and Dyspnea. Cardio Negative Chest pain, Edema and Irregular heartbeat/palpitations.    GI Negative Abdominal distention, Abdominal pain, Bloating, Change in bowel habits, Constipation, Decreased appetite, Diarrhea, Dysphagia, Heartburn, Hematemesis, Hematochezia, Melena, Nausea, Rectal pain, Rectal pain associated w/ bleeding, Reflux and Vomiting.  Negative Dysuria and Hematuria. Endocrine Negative Cold intolerance and Heat intolerance. Neuro Negative Dizziness, Headache, Numbness and Tremors. Psych Negative Anxiety, Depression and Increased stress. Integumentary Negative Hives, Perirectal itching, Pruritus and Rash. MS Negative Back pain, Joint pain and Myalgia. Hema/Lymph Negative Easy bleeding, Easy bruising and Lymphadenopathy. Allergic/Immuno Negative Food allergies and Immunosuppression. Vital Signs   Height  Time ft in cm Last Measured Height Position   3:37 PM 5.0 10.00 177.80 01/18/2022 0     Weight/BSA/BMI  Time lb oz kg Context BMI kg/m2 BSA m2   3:37 .00  75.296  23.82 1.93     Date/Time Temp Pulse BP Arterial Line 1 BP (mmHg) BP Patient Position Resp SpO2 O2 Device O2 Flow Rate (L/min) Level of Consciousness MEWS Score Weight   05/18/22 1003 97.8 °F (36.6 °C) 75 140/61 Abnormal  -- -- 16 97 % None (Room air) -- Alert (0) 1 73.4 kg (161 lb 12.8 oz)       Physical  Exam  Exam Findings Details   Constitutional Normal Well developed. Eyes Normal Conjunctiva - Right: Normal, Left: Normal. Sclera - Right: Normal, Left: Normal.   Nasopharynx Normal Lips/teeth/gums - Normal.   Neck Exam Normal Inspection - Normal.   Respiratory Normal Inspection - Normal.   Cardiovascular Normal Regular rate and rhythm. No murmurs, gallops, or rubs. Vascular Normal Pulses - Brachial: Normal.   Skin Normal Inspection - Normal.   Musculoskeletal Normal Hands/Wrist - Right: Normal, Left: Normal.   Extremity Normal No edema. Neurological Normal Fine motor skills - Normal.   Psychiatric Normal Orientation - Oriented to time, place, person & situation. Appropriate mood and affect.    Immunizations Entered by History  Date Immunization   9/21/2016 12:00:00 AM influenza, high dose seasonal, preservative-free   1/1/2013 12:00:00 AM Zoster   3/31/2010 12:00:00 AM Pneumo (2 yrs or older) (PPV23)   10/11/2012 12:00:00 AM flu (split) (3 yrs or older)       Active Patient Care Team Members  Name Contact Agency Type Support Role Relationship Active Date Inactive Date Specialty   Mabeline Heading   attending [facility]   Patient provider PCP   Family Practice   Yari Watson   encounter provider    Urology   [address] [place] [address]   encounter provider    Gastroenterology       No change in H&P

## 2022-05-18 NOTE — DISCHARGE INSTRUCTIONS
Marlene Aparicio  [id number]  1945    EGD DISCHARGE INSTRUCTIONS  Discomfort:  Sore throat- throat lozenges or warm salt water gargle  redness at IV site- apply warm compress to area; if redness or soreness persist- contact your physician  Gaseous discomfort- walking, belching will help relieve any discomfort  You may not operate a vehicle until the next day  You may not engage in an occupation involving machinery or appliances until the next day  You may not drink alcoholic beverages until the next day  Avoid making any critical decisions for at least 24 hour    DIET   You may not resume your regular diet. Antireflux diet. ACTIVITY  You may not resume your normal daily activities   Spend the remainder of the day resting -  avoid any strenuous activity. CALL M.D. ANY SIGN OF   Increasing pain, nausea, vomiting  Abdominal distension (swelling)  New increased bleeding (oral or rectal)  Fever (chills)  Pain in chest area  Bloody discharge from nose or mouth  Shortness of breath     You may not take any Advil, Aspirin, Ibuprofen, Motrin, Aleve, or Goodys preferably ONLY  Tylenol as needed for pain. Follow-up Instructions: Follow-up in the office as scheduled or make a follow-up appointment in 2 weeks. Connie August MD  May 18, 2022        DISCHARGE SUMMARY from Nurse    PATIENT INSTRUCTIONS:    After general anesthesia or intravenous sedation, for 24 hours or while taking prescription Narcotics:  · Limit your activities  · Do not drive and operate hazardous machinery  · Do not make important personal or business decisions  · Do  not drink alcoholic beverages  · If you have not urinated within 8 hours after discharge, please contact your surgeon on call.     Report the following to your surgeon:  · Excessive pain, swelling, redness or odor of or around the surgical area  · Temperature over 100.5  · Nausea and vomiting lasting longer than 4 hours or if unable to take medications  · Any signs of decreased circulation or nerve impairment to extremity: change in color, persistent  numbness, tingling, coldness or increase pain  · Any questions    What to do at Home:  Recommended activity: Activity as tolerated and no driving for today. If you experience any of the following symptoms as above, please follow up with Dr. Lion Sanders. *  Please give a list of your current medications to your Primary Care Provider. *  Please update this list whenever your medications are discontinued, doses are      changed, or new medications (including over-the-counter products) are added. *  Please carry medication information at all times in case of emergency situations. These are general instructions for a healthy lifestyle:    No smoking/ No tobacco products/ Avoid exposure to second hand smoke  Surgeon General's Warning:  Quitting smoking now greatly reduces serious risk to your health. Obesity, smoking, and sedentary lifestyle greatly increases your risk for illness    A healthy diet, regular physical exercise & weight monitoring are important for maintaining a healthy lifestyle    You may be retaining fluid if you have a history of heart failure or if you experience any of the following symptoms:  Weight gain of 3 pounds or more overnight or 5 pounds in a week, increased swelling in our hands or feet or shortness of breath while lying flat in bed. Please call your doctor as soon as you notice any of these symptoms; do not wait until your next office visit. The discharge information has been reviewed with the patient and spouse. The patient and spouse verbalized understanding. Discharge medications reviewed with the patient and spouse and appropriate educational materials and side effects teaching were provided.   ___________________________________________________________________________________________________________________________________    Patient armband removed and shredded

## 2023-05-23 RX ORDER — SODIUM CHLORIDE 9 MG/ML
INJECTION, SOLUTION INTRAVENOUS CONTINUOUS
Status: CANCELLED | OUTPATIENT
Start: 2023-05-23

## 2023-05-23 NOTE — H&P
Weight loss. ENMT Negative Ear infections, Nasal congestion, Sinus Infection and Sore throat. Eyes Negative Double vision and Eye pain. Respiratory Negative Asthma, Chronic cough, Dyspnea, Pleuritic pain and Wheezing. Cardio Negative Chest pain, Edema and Irregular heartbeat/palpitations. GI Positive Blood in stool. GI Negative Abdominal pain, Bloating, Change in appetite, Change in bowel habits, Constipation, Decreased appetite, Diarrhea, Dysphagia, Heartburn, Hematemesis, Hematochezia, Melena, Nausea, Reflux and Vomiting.  Negative Dysuria, Hematuria, Urinary frequency, Urinary incontinence and Urinary retention. Endocrine Negative Cold intolerance, Gynecomastia, Heat intolerance and Increased thirst.   Neuro Negative Dizziness, Headache, Numbness, Tremors and Vertigo. Psych Negative Anxiety, Depression and Increased stress. Integumentary Negative Hives, Pruritus and Rash. MS Negative Back pain, Joint pain and Myalgia. Hema/Lymph Negative Easy bleeding, Easy bruising and Lymphadenopathy. Allergic/Immuno Negative Chemicals in work place, Contact allergy, Food allergies, Immunosuppression and Seasonal allergies. Reproductive Negative Penile discharge and Sexual dysfunction. Vital Signs   Height  Time ft in cm Last Measured Height Position   3:54 PM 5.0 10.00 177.80 11/09/2022 0     Weight/BSA/BMI  Time lb oz kg Context BMI kg/m2 BSA m2   3:54 .00  74.843 dressed with shoes 23.67      Date/Time Temp Pulse Resp BP SpO2 O2 Device O2 Flow Rate (L/min) Weight [facility]   05/24/23 1204 96.5 °F (35.8 °C) Abnormal  77 20 140/71 Abnormal  98 % -- -- 176 lb (79.8 kg)    05/24/23 1203 96.5 °F (35.8 °C) Abnormal  -- -- -- -- None (Room air) -- 175 lb (79.4 kg) SS     Physical  Exam  Exam Findings Details   Constitutional Normal No acute distress. Well Nourished. Well developed.    Eyes Normal General - Right: Normal, Left: Normal. Conjunctiva - Right: Normal, Left: Normal. Sclera - Right:

## 2023-05-24 ENCOUNTER — HOSPITAL ENCOUNTER (OUTPATIENT)
Facility: HOSPITAL | Age: 78
Setting detail: OUTPATIENT SURGERY
Discharge: HOME | End: 2023-05-24
Attending: INTERNAL MEDICINE | Admitting: INTERNAL MEDICINE
Payer: MEDICARE

## 2023-05-24 VITALS
TEMPERATURE: 97.3 F | HEIGHT: 69 IN | RESPIRATION RATE: 18 BRPM | HEART RATE: 77 BPM | OXYGEN SATURATION: 96 % | SYSTOLIC BLOOD PRESSURE: 118 MMHG | DIASTOLIC BLOOD PRESSURE: 92 MMHG | BODY MASS INDEX: 26.07 KG/M2 | WEIGHT: 176 LBS

## 2023-05-24 PROCEDURE — 99152 MOD SED SAME PHYS/QHP 5/>YRS: CPT | Performed by: INTERNAL MEDICINE

## 2023-05-24 PROCEDURE — 3600007512: Performed by: INTERNAL MEDICINE

## 2023-05-24 PROCEDURE — 7100000011 HC PHASE II RECOVERY - ADDTL 15 MIN: Performed by: INTERNAL MEDICINE

## 2023-05-24 PROCEDURE — 2709999900 HC NON-CHARGEABLE SUPPLY: Performed by: INTERNAL MEDICINE

## 2023-05-24 PROCEDURE — 6360000002 HC RX W HCPCS: Performed by: INTERNAL MEDICINE

## 2023-05-24 PROCEDURE — 3600007502: Performed by: INTERNAL MEDICINE

## 2023-05-24 PROCEDURE — 2580000003 HC RX 258: Performed by: INTERNAL MEDICINE

## 2023-05-24 PROCEDURE — 99153 MOD SED SAME PHYS/QHP EA: CPT | Performed by: INTERNAL MEDICINE

## 2023-05-24 PROCEDURE — 7100000010 HC PHASE II RECOVERY - FIRST 15 MIN: Performed by: INTERNAL MEDICINE

## 2023-05-24 RX ORDER — MIDAZOLAM HYDROCHLORIDE 1 MG/ML
5 INJECTION, SOLUTION INTRAMUSCULAR; INTRAVENOUS
Status: DISCONTINUED | OUTPATIENT
Start: 2023-05-24 | End: 2023-05-24 | Stop reason: HOSPADM

## 2023-05-24 RX ORDER — FLUMAZENIL 0.1 MG/ML
0.2 INJECTION INTRAVENOUS ONCE
Status: DISCONTINUED | OUTPATIENT
Start: 2023-05-24 | End: 2023-05-24 | Stop reason: HOSPADM

## 2023-05-24 RX ORDER — EPINEPHRINE 0.1 MG/ML
1 SYRINGE (ML) INJECTION ONCE
Status: DISCONTINUED | OUTPATIENT
Start: 2023-05-24 | End: 2023-05-24 | Stop reason: HOSPADM

## 2023-05-24 RX ORDER — SODIUM CHLORIDE 9 MG/ML
INJECTION, SOLUTION INTRAVENOUS CONTINUOUS
Status: DISCONTINUED | OUTPATIENT
Start: 2023-05-24 | End: 2023-05-24 | Stop reason: HOSPADM

## 2023-05-24 RX ORDER — FENTANYL CITRATE 50 UG/ML
100 INJECTION, SOLUTION INTRAMUSCULAR; INTRAVENOUS
Status: DISCONTINUED | OUTPATIENT
Start: 2023-05-24 | End: 2023-05-24 | Stop reason: HOSPADM

## 2023-05-24 RX ORDER — FENTANYL CITRATE 50 UG/ML
INJECTION, SOLUTION INTRAMUSCULAR; INTRAVENOUS PRN
Status: DISCONTINUED | OUTPATIENT
Start: 2023-05-24 | End: 2023-05-24 | Stop reason: ALTCHOICE

## 2023-05-24 RX ORDER — MIDAZOLAM HYDROCHLORIDE 1 MG/ML
INJECTION INTRAMUSCULAR; INTRAVENOUS PRN
Status: DISCONTINUED | OUTPATIENT
Start: 2023-05-24 | End: 2023-05-24 | Stop reason: ALTCHOICE

## 2023-05-24 RX ORDER — GLYCOPYRROLATE 0.2 MG/ML
0.1 INJECTION INTRAMUSCULAR; INTRAVENOUS ONCE
Status: DISCONTINUED | OUTPATIENT
Start: 2023-05-24 | End: 2023-05-24 | Stop reason: HOSPADM

## 2023-05-24 RX ORDER — DIPHENHYDRAMINE HYDROCHLORIDE 50 MG/ML
25 INJECTION INTRAMUSCULAR; INTRAVENOUS EVERY 6 HOURS PRN
Status: DISCONTINUED | OUTPATIENT
Start: 2023-05-24 | End: 2023-05-24 | Stop reason: HOSPADM

## 2023-05-24 RX ORDER — NALOXONE HYDROCHLORIDE 0.4 MG/ML
0.4 INJECTION, SOLUTION INTRAMUSCULAR; INTRAVENOUS; SUBCUTANEOUS PRN
Status: DISCONTINUED | OUTPATIENT
Start: 2023-05-24 | End: 2023-05-24 | Stop reason: HOSPADM

## 2023-05-24 RX ORDER — SIMETHICONE 20 MG/.3ML
40 EMULSION ORAL EVERY 6 HOURS PRN
Status: DISCONTINUED | OUTPATIENT
Start: 2023-05-24 | End: 2023-05-24 | Stop reason: HOSPADM

## 2023-05-24 RX ADMIN — SODIUM CHLORIDE: 9 INJECTION, SOLUTION INTRAVENOUS at 12:25

## 2023-05-24 ASSESSMENT — PAIN SCALES - GENERAL: PAINLEVEL_OUTOF10: 0

## 2023-05-24 NOTE — PRE SEDATION
Sedation Pre-Procedure Note    Patient Name: Isra White   YOB: 1945  Room/Bed: ENDO/PL  Medical Record Number: [id number]  Date: 5/24/2023   Time: 12:32 PM       Indication:  history of colon polyps and rectal bleeding    Consent: I have discussed with the patient and/or the patient representative the indication, alternatives, and the possible risks and/or complications of the planned procedure and the anesthesia methods. The patient and/or patient representative appear to understand and agree to proceed. Vital Signs:   Vitals:    05/24/23 1204   BP: (!) 140/71   Pulse: 77   Resp: 20   Temp: (!) 96.5 °F (35.8 °C)   SpO2: 98%       Past Medical History:   has a past medical history of Arrhythmia, Chronic obstructive pulmonary disease ([address] and Hypertension. Past Surgical History:   has a past surgical history that includes pr unlisted procedure abdomen peritoneum & omentum (Right, 1985); Colonoscopy (N/A, 3/4/2020); heent (Right, 2016); Cataract removal (Bilateral, 2018); and Tonsillectomy (Bilateral). Medications:   Scheduled Meds:    flumazenil  0.2 mg IntraVENous Once    benzocaine   Mouth/Throat 4x Daily    EPINEPHrine  1 mg IntraVENous Once    glycopyrrolate  0.1 mg IntraVENous Once     Continuous Infusions:    fentaNYL      midazolam      sodium chloride 100 mL/hr at 05/24/23 1225     PRN Meds: naloxone, simethicone, indomethacin, diphenhydrAMINE  Home Meds:   Prior to Admission medications    Medication Sig Start Date End Date Taking?  Authorizing Provider   aspirin 81 MG EC tablet Take by mouth daily    Ar Automatic Reconciliation   atorvastatin (LIPITOR) 40 MG tablet Take by mouth daily    Ar Automatic Reconciliation   hydroCHLOROthiazide (HYDRODIURIL) 25 MG tablet Take 1 tablet by mouth daily    Ar Automatic Reconciliation   tamsulosin (FLOMAX) 0.4 MG capsule Take 1 capsule by mouth daily    Ar Automatic Reconciliation     Coumadin Use Last 7 Days:  no  Antiplatelet drug therapy use

## 2023-05-24 NOTE — PERIOP NOTE
Reviewed PTA medication list with patient/caregiver and patient/caregiver denies any additional medications. Patient admits to having a responsible adult care for them for at least 24 hours after surgery. Pt up to bathroom to void with assistance.

## 2023-05-24 NOTE — DISCHARGE INSTRUCTIONS
Cam Buckner  [id number]  1945    COLON DISCHARGE INSTRUCTIONS    Discomfort:  Redness at IV site- apply warm compress to area; if redness or soreness persist- contact your physician  There may be a slight amount of blood passed from the rectum  Gaseous discomfort- walking, belching will help relieve any discomfort  You may not operate a vehicle til the next day. You may not engage in an occupation involving machinery or appliances til the next day. You may not drink alcoholic beverages til the next day. DIET:   High fiber diet. ACTIVITY:  You may not  resume your normal daily activities til the next day. it is recommended that you spend the remainder of the day resting -  avoid any strenuous activity. CALL M.D.  IF ANY SIGN OF:   Increasing pain, nausea, vomiting  Abdominal distension (swelling)  New increased bleeding (oral or rectal)  Fever (chills)  Pain in chest area  Bloody discharge from nose or mouth  Shortness of breath    You may not  take any Advil, Aspirin, Ibuprofen, Motrin, Aleve, or Goodys ONLY  Tylenol as needed for pain. Post procedure diagnosis:  Very difficult colonoscopy. Medium sized Internal hemorrhoids. Very difficult, tortuous and fixed sigmoid. Congested folds at 30 cm. Moderately diffuse diverticulosis. A tattoo in the mid transverse colon but no  residual polyp or tumor. Follow-up Instructions: Your follow up colonoscopy will be in 5 years. We will notify you the results of your biopsy by letter within 2 weeks.     Jackie Uribe MD  May 24, 2023       DISCHARGE SUMMARY from Nurse    PATIENT INSTRUCTIONS:    After general anesthesia or intravenous sedation, for 24 hours or while taking prescription Narcotics:  Limit your activities  Do not drive and operate hazardous machinery  Do not make important personal or business decisions  Do  not drink alcoholic beverages  If you have not urinated within 8 hours after discharge, please contact your surgeon on

## 2023-05-24 NOTE — PROCEDURES
tumor. Ascending Colon:   Normal  Cecum:   Normal  Terminal Ileum:   Not entered. Unplanned Events: There were no unplanned events. Estimated Blood Loss: None  IMPLANTS: * No implants in log *  Impressions: Very difficult colonoscopy. External hemorrhoids. Medium sized Internal hemorrhoids. Very difficult, tortuous and fixed sigmoid. Large congested folds in the proximal sigmoid at 30 cm. Probably sequala of previous diverticulitis? Moderately diffuse diverticulosis. A tattoo in the mid transverse colon but no  residual polyp or tumor. . Normal Mucosa. No blood, polyps or AVM found. Complications: None; patient tolerated the procedure well. Recommendations:  Discharge home when standard parameters are met. Resume a high fiber diet. Resume own medications. Avoid all NSAID's for life. Colonoscopy recommendation in 5 years. Take Miralax and/ or Colace 100 mg on regular basis if constipated.   Recommend sigmoid resection by colorectal surgeon    Procedure Codes:    COLONOSCOPY [[id number]]    Endoscope Information:  Model Number(s)    [id number]   Assistant: None  Signed By: Jeff Cuba MD Date: 5/24/2023

## 2023-05-24 NOTE — PERIOP NOTE
Dx: internal hemorrhoids; transverse colon tattoo at 66; extensive diverticulosis; torturous and fixed sigmoid.     Per Dr. Caro Pereyra

## (undated) DEVICE — GARMENT,MEDLINE,DVT,INT,CALF,MED, GEN2: Brand: MEDLINE

## (undated) DEVICE — MAJ-1414 SINGLE USE ADPATER BIOPSY VALV: Brand: SINGLE USE ADAPTOR BIOPSY VALVE

## (undated) DEVICE — SPONGE GZ W4XL4IN COT 12 PLY TYP VII WVN C FLD DSGN

## (undated) DEVICE — TRAP SPEC COLL POLYP POLYSTYR --

## (undated) DEVICE — Device

## (undated) DEVICE — BIPOLAR ELECTROHEMOSTASIS CATHETER: Brand: GOLD PROBE

## (undated) DEVICE — WRISTBAND ID AD W2.5XL9.5CM RED VYN ADH CLSR UNI-PRINT

## (undated) DEVICE — SYR 5ML 1/5 GRAD LL NSAF LF --

## (undated) DEVICE — SINGLE PORT MANIFOLD: Brand: NEPTUNE 2

## (undated) DEVICE — SOLUTION IV 500ML 0.9% SOD CHL FLX CONT

## (undated) DEVICE — BLUNTFILL: Brand: MONOJECT

## (undated) DEVICE — SYRINGE MED 3ML CLR PLAS STD N CTRL LUERLOCK TIP DISP

## (undated) DEVICE — SNARE POLYP SM W13MMXL240CM SHTH DIA2.4MM OVL FLX DISP

## (undated) DEVICE — SYRINGE 50ML E/T

## (undated) DEVICE — MOUTHPIECE ENDOSCP 20X27MM --

## (undated) DEVICE — TRNQT TEXT 1X18IN BLU LF DISP -- CONVERT TO ITEM 362165

## (undated) DEVICE — CATH SUC CTRL PRT TRIFLO 14FR --

## (undated) DEVICE — TUBING, SUCTION, 1/4" X 12', STRAIGHT: Brand: MEDLINE

## (undated) DEVICE — TOURNIQUET PHLEB W1XL18IN BLU FLAT RL AND BND REUSE FOR IV

## (undated) DEVICE — Device: Brand: SPOT EX ENDOSCOPIC TATTOO

## (undated) DEVICE — NDL PRT INJ NSAF BLNT 18GX1.5 --

## (undated) DEVICE — CATH IV SAFE STR 22GX1IN BLU -- PROTECTIV PLUS

## (undated) DEVICE — ERBE NESSY®PLATE 170 SPLIT; 168CM²; CABLE 3M: Brand: ERBE

## (undated) DEVICE — CATHETER IV 22GA L1IN BLU POLYUR STR HUB RADPQ PROTCT +

## (undated) DEVICE — KENDALL RADIOLUCENT FOAM MONITORING ELECTRODE RECTANGULAR SHAPE: Brand: KENDALL

## (undated) DEVICE — CANNULA CUSH AD W/ 14FT TBG

## (undated) DEVICE — SET ADMIN 16ML TBNG L100IN 2 Y INJ SITE IV PIGGY BK DISP

## (undated) DEVICE — REM POLYHESIVE ADULT PATIENT RETURN ELECTRODE: Brand: VALLEYLAB

## (undated) DEVICE — SOLUTION IV 1000ML 20MEQ K CHL IN 0.9% SOD CHL FLX CONT

## (undated) DEVICE — MEDI-VAC NON-CONDUCTIVE SUCTION TUBING: Brand: CARDINAL HEALTH

## (undated) DEVICE — ENDO CARRY-ON PROCEDURE KIT INCLUDES ENZYMATIC SPONGE, GAUZE, BIOHAZARD LABEL, TRAY, LUBRICANT, DIRTY SCOPE LABEL, WATER LABEL, TRAY, DRAWSTRING PAD, AND DEFENDO 4-PIECE KIT.: Brand: ENDO CARRY-ON PROCEDURE KIT

## (undated) DEVICE — CATHETER PH SUCT 14FR

## (undated) DEVICE — SYRINGE MED 5ML STD CLR PLAS LUERLOCK TIP N CTRL DISP

## (undated) DEVICE — NDL INJ SCLERO 25G 240CM -- INTERJECT M00518360 BX/5

## (undated) DEVICE — NDL FLTR TIP 5 MIC 18GX1.5IN --

## (undated) DEVICE — SPONGE GZ W4XL4IN RAYON POLY 4 PLY NONWOVEN FASTER WICKING

## (undated) DEVICE — SYR 3ML LL TIP 1/10ML GRAD --

## (undated) DEVICE — SET ADMIN 16ML TBNG L100IN 2 Y INJ SITE IV PIGGY BK DISP (ORDER IN MULIPLES OF 48)